# Patient Record
(demographics unavailable — no encounter records)

---

## 2024-12-10 NOTE — RESULTS/DATA
[TextEntry] : CT ZP 12/6/24: Marked emphysema. 1 cm lingular nodule with surrounding GGO. 5mm RLL and 9mm subpleural RLL nodules. Mild diffuse bilateral bronchial wall thickening with LLL mucous plugging.

## 2024-12-10 NOTE — PHYSICAL EXAM
[General Appearance - Well Developed] : well developed [General Appearance - Well Nourished] : well nourished [Normal Oropharynx] : normal oropharynx [Neck Appearance] : the appearance of the neck was normal [Heart Sounds] : normal S1 and S2 [Respiration, Rhythm And Depth] : normal respiratory rhythm and effort [Nail Clubbing] : no clubbing of the fingernails [Cyanosis, Localized] : no localized cyanosis

## 2024-12-10 NOTE — DISCUSSION/SUMMARY
[FreeTextEntry1] : Assessment COPD, moderate (496) (J44.9) Cough, persistent (786.2) (R05.3) Mild seasonal rhinitis  Moderate COPD requiring 02 - pulsed - 3-4 LPM. Has POC  Stable questionable nodules  Back pain Cough resolved on Doxycycline in the past  Plan  Zpak and Medrol DosePak  prn Trelegy - briefly 200 dose Sputum C&S Azithromycin T.I.W. CT Chest as needed - not a candidate for aggressive VENTURA or Rx Sput C&S Pursed lip breathing Yearly FLU vaccine Low threshold to antibiotic RX Dalyresp. Nebulizer with albuterol Pred taper and maintenance if continues to have problems Suppressive azith - stopped Pulsed 02 at all times

## 2024-12-10 NOTE — CONSULT LETTER
[Dear  ___] : Dear  [unfilled], [Consult Letter:] : I had the pleasure of evaluating your patient, [unfilled]. [Please see my note below.] : Please see my note below. [Consult Closing:] : Thank you very much for allowing me to participate in the care of this patient.  If you have any questions, please do not hesitate to contact me. [Sincerely,] : Sincerely, [DrJuan  ___] : Dr. ACUNA

## 2024-12-10 NOTE — HISTORY OF PRESENT ILLNESS
[FreeTextEntry1] :    79 yo male retired [place]  and former 100 pack year smoker (DC in ) seen for dyspnea on exertion, and pulmonary nodules. He denies cp, fever, cough, wt loss, sweats, orthopnea, pedal edema or hemoptysis.  There is no family H/O lung cancer, but both of his parents  young in accident.  In the past he has done work under his house and applied fertilizer to his lawn.  His breathing was worse after the preceding activities so he has learned to let others do it. He is exercising less. He is now using spiriva and asmanex and is doing well.  He goes to [facility] [place]. His wife, Mary Jo, reported SHABAZZ in early 2014.  Treated with a prednisone taper and a zpak with marked improvement. Doing well when seen on 9/15/15. Back pain.  Stable dyspnea.  No edema or sputum  02 recommended - Has it - rarely uses it.  18 Enlarging ventral hernia threatening to incarcerate Dr Olivas wants to treat with open reduction and mesh placement Spoke with Dr Olivas about risks today  18 Tolerated hernia repair and cataract surgery  19 Enjoyed winter in [facility] No complaint Doing OK  9/10/19 No complaints  20 No complaints No sputum   20 More SHABAZZ S/P fall with spine fx No better p doxycycline and medrol No sputum or edema Flying to [facility] soon Mary Jo nearing assisted living status   21 Spine fractures with pain  Likes Trelegy Breathing OK At [facility] ED on 21 with GI complaints (NSAID=> GI upset) Rx for UTI and "pneumonia" Await upper endo by Dr Hendricks  May need hernia repair by Dr Olivas   21 Hernia surgery complicated by reaction to analgesia, afib and hypoxia DC to [facility] - off  Dr Olivas monitoring a post op hematoma Remains on AC - 02 prn - self monitors 02sat  21 Cough and congestion after staining his dock - used by a neighbor  22 Kyphoplasty on 22 Has been inactive SHABAZZ SHABAZZ Wants rehab Sees cardiology for PAF  7/15/22 [facility] with pneumonia [place] PARRIS until today ON  - needs continuous pulsed at 3-4 LPM currently Cough Walking Going to Nurse facility with Mary Jo   10/3/22 Cough and congestion To [facility] soon  23 Cough => severe antibiotics and one steroid course Better on ? Augmentin H/O spinal fractures   23 Persistent cough despite multiple antibiotic courses  23 Doxycycline seems to have been effective Cough resolved   12/10/24 I reviewed all recent notes, orders, lab results and images for 15 minutes on all available platforms (including [email or web address], [employer], [employer], and "[employer] Epic prior to this visit and made notes. Persistent cough CT with nodules and consolidations

## 2025-01-07 NOTE — DISCUSSION/SUMMARY
[FreeTextEntry1] : Assessment COPD, moderate (496) (J44.9) Cough, persistent (786.2) (R05.3) Mild seasonal rhinitis  Moderate COPD requiring 02 - pulsed - 3-4 LPM. Has POC  Stable questionable nodules  Back pain Cough resolved on Doxycycline in the past  Plan  Zpak and Medrol DosePak  prn Trelegy - briefly 200 dose Sputum C&S Azithromycin T.I.W. CT Chest as needed - not a candidate for aggressive VENTURA or Rx Pursed lip breathing Yearly FLU vaccine Low threshold to antibiotic RX Dalyresp. Nebulizer with albuterol Pred taper and maintenance if continues to have problems Suppressive azith  Pulsed 02 at all times FU CT by April 3% NS via neb am

## 2025-01-07 NOTE — HISTORY OF PRESENT ILLNESS
[FreeTextEntry1] :    79 yo male retired [place]  and former 100 pack year smoker (DC in ) seen for dyspnea on exertion, and pulmonary nodules. He denies cp, fever, cough, wt loss, sweats, orthopnea, pedal edema or hemoptysis.  There is no family H/O lung cancer, but both of his parents  young in accident.  In the past he has done work under his house and applied fertilizer to his lawn.  His breathing was worse after the preceding activities so he has learned to let others do it. He is exercising less. He is now using spiriva and asmanex and is doing well.  He goes to [facility] [place] His wife, Mary Jo, reported SHABAZZ in early 2014.  Treated with a prednisone taper and a zpak with marked improvement. Doing well when seen on 9/15/15. Back pain.  Stable dyspnea.  No edema or sputum  02 recommended - Has it - rarely uses it.  18 Enlarging ventral hernia threatening to incarcerate Dr Olivas wants to treat with open reduction and mesh placement Spoke with Dr Olivas about risks today  18 Tolerated hernia repair and cataract surgery  19 Enjoyed winter in [facility] No complaint Doing OK  9/10/19 No complaints  20 No complaints No sputum   20 More SHABAZZ S/P fall with spine fx No better p doxycycline and medrol No sputum or edema Flying to [facility] soon Mary Jo nearing assisted living status   21 Spine fractures with pain  Likes Trelegy Breathing OK At [facility] ED on 21 with GI complaints (NSAID=> GI upset) Rx for UTI and "pneumonia" Await upper endo by Dr Hendricks  May need hernia repair by Dr Olivas   21 Hernia surgery complicated by reaction to analgesia, afib and hypoxia DC to [facility] - off  Dr Olivas monitoring a post op hematoma Remains on AC - 02 prn - self monitors 02sat  21 Cough and congestion after staining his dock - used by a neighbor  22 Kyphoplasty on 22 Has been inactive SHABAZZ SHABAZZ Wants rehab Sees cardiology for PAF  7/15/22 [facility] with pneumonia [place] PARRIS until today ON  - needs continuous pulsed at 3-4 LPM currently Cough Walking Going to Nurse facility with Mary Jo   10/3/22 Cough and congestion To [facility] soon  23 Cough => severe antibiotics and one steroid course Better on ? Augmentin H/O spinal fractures   23 Persistent cough despite multiple antibiotic courses  23 Doxycycline seems to have been effective Cough resolved   12/10/24 I reviewed all recent notes, orders, lab results and images for 15 minutes on all available platforms (including [employer], [facility] [employer], [employer], and [employer] Epic prior to this visit and made notes. Persistent cough CT with nodules and consolidations   25 Albuterol neb makes him worse Mild cough OK with suppressive Azithromycin

## 2025-02-25 NOTE — HISTORY OF PRESENT ILLNESS
[de-identified] : Patient is a 85 year old male on home O2 with a history AF on Eliquis. pt recently diagnosed with basal cell carcinoma that was excised by local dermatologist & malignant melanoma to scalp. Path: 1-30-25  Left superior occipital scalp: Basal cell carcinoma nodular type  Right superior parietal scalp: Malignant melanoma measuring approximately .2mm in thickness

## 2025-02-25 NOTE — ASSESSMENT
[FreeTextEntry1] : Patient presents for evaluation of a superficial melanoma of the scalp.  I discussed with him the clinical behavior of these tumors.  I explained to him that superficial melanomas do not have a high risk of metastasis.  I told him the main objective is treatment is to control the disease at the primary site.  I explained to him the role of surgery and management.  I told him this would require wide excision with adequate margins surrounding the disease.  This would lead to a defect that at least 3.5 cm in greatest dimension.  I told him that the defect can be closed using a combination of a local advancement flap with or without a full-thickness skin graft.  I explained to him the nature of the operation including the risk benefits and potential sequelae which she understood.  He wants to undergo surgery under my care.  I have made arrangements for this to occur in our outpatient facility.  I did discuss the issues with his oxygen use given that we use electrocautery for the operation.  He states he can be off oxygen for extended periods of time which would facilitate the safe conduct of surgery.  I will see him again at the time of the operation.  I will see him sooner should changes or issues arise.

## 2025-02-25 NOTE — ADDENDUM
[FreeTextEntry1] : Documented by Benito Shields acting as a scribe for Dr. Johana Johnson on 2/25/25. All medical record entries made by the Scribe were at my, Dr. Johana Johnson, direction and personally dictated by me on 2/25/25. I have reviewed the chart and agree that the record accurately reflects my personal performance of the history, physical exam, assessment and plan. I have also personally directed, reviewed, and agree with the discharge instructions.

## 2025-02-25 NOTE — PHYSICAL EXAM
[de-identified] : Examination is limited to the head and neck area. The patient is a well-nourished individual who is in no acute distress and appears their stated age.   Examination of the head and neck skin reveals multiple areas of keratosis throughout the head and neck skin. Several scars from prior skin cancer treatment were noted. A healing biopsy site that was well circumscribed, measured 1.5cm in greatest dimension, and appeared superficial without adherence to under lying tissue was present on the right superior parietal scalp. The examination of this area was partially obscured by inflammation, but I did not observe significant residual pigmented changed.   There are no palpable masses in the parotid or submandibular regions. The jugular and posterior cervical chains are devoid of palpable adenopathy. There are no palpable diseases within the central compartment.   Facial nerve function is intact. No other cranial nerve deficits are identified.   The patient is able to open their mouth fully. There is no restriction in tongue mobility. Visualization and palpation of the contents of the oral cavity shows no evidence of mucosal or submucosal pathology.   Visualization and palpation in the oropharynx show no lesion in the base of the tongue, tonsil or soft palate.

## 2025-03-10 NOTE — PROCEDURE
[FreeTextEntry1] : Wide resection of right paramedian scalp melanoma with local flap closure  [FreeTextEntry3] : Surgeon: Dr. Johana Johnson Assistant: Jessie DEJESUS Preoperative Diagnosis: Malignant melanoma of right paramedian scalp Postoperative Diagnosis: Same Anesthesia: Buffered lidocaine with 1% epinephrine 15cc     Description of Procedure:   After informed consent was obtained and appropriate timeout procedure was completed, the patient was brought to the procedure room and placed in the supine. The area was prepped and draped in the usual sterile fashion. Local anesthesia with was administered. O2 saturation was monitored throughout the case. oxygen was turned off during electrocautery to minimize risk of fire.   An incision was marked with at least 1cm margins around the clinically evident extent of the lesion. The incision was made with a 15 blade and electrocautery then used to perform the remainder of the dissection. The lesion was excised down to the periosteum oriented with a silk suture in the anterior position and sent in formalin for pathological examination. Hemostasis was achieved with electrocautery. Additional margin was taken at the deep aspect including the periosteum and send separately for pathological evaluation in formalin.   A local advancement flap was designed and elevated adjacent to the defect. The skin was widely undermined and rotated medially to fill the defect. The flap was inset with 0 and 2-0 prolene sutures in layers, ensuring good vascularity with some tension.   Estimated Blood Loss: 10cc Complications: None Plan: Follow-up in 2 weeks for wound check and suture removal. Instructions given for wound care and patient was asked to contact us for signs of infection or if other issues arise.

## 2025-03-10 NOTE — REASON FOR VISIT
[Procedure: _________] : a [unfilled] procedure visit [FreeTextEntry1] : Wide resection of right paramedian scalp melanoma with local flap closure

## 2025-03-10 NOTE — ADDENDUM
[FreeTextEntry1] : Documented by Benito Shields acting as a scribe for Dr. Johana Johnson on 3/10/2025. All medical record entries made by the Scribe were at my, Dr. Johana Johnson, direction and personally dictated by me on 3/10/2025. I have reviewed the chart and agree that the record accurately reflects my personal performance of the history, physical exam, assessment and plan. I have also personally directed, reviewed, and agree with the discharge instructions.

## 2025-03-25 NOTE — PHYSICAL EXAM
[de-identified] :  Sutures were removed from the right paramedian scalp and steristrips were applied. The incision is healing nicely and there is no sign of infection. The remainder of the head neck evaluation was noncontributory to the current problem.

## 2025-03-25 NOTE — HISTORY OF PRESENT ILLNESS
[de-identified] : Patient is an 85 year old male with a history of malignant melanoma of the right paramedian scalp for which he underwent a wide resection of right paramedian scalp melanoma with local flap closure. The procedure was performed on 3/10/2025.     [FreeTextEntry1] : Patient is an 85 year old male who presents for a post operative visit of his procedure done on 3/10/2025. He offers no complaints.

## 2025-03-25 NOTE — PHYSICAL EXAM
[de-identified] :  Sutures were removed from the right paramedian scalp and steristrips were applied. The incision is healing nicely and there is no sign of infection. The remainder of the head neck evaluation was noncontributory to the current problem.

## 2025-03-25 NOTE — HISTORY OF PRESENT ILLNESS
[de-identified] : Patient is an 85 year old male with a history of malignant melanoma of the right paramedian scalp for which he underwent a wide resection of right paramedian scalp melanoma with local flap closure. The procedure was performed on 3/10/2025.     [FreeTextEntry1] : Patient is an 85 year old male who presents for a post operative visit of his procedure done on 3/10/2025. He offers no complaints.

## 2025-03-25 NOTE — ASSESSMENT
[FreeTextEntry1] :  Patient is an 85 year old male who presents for a post operative follow up of his procedure performed on 3/10/2025. Their incision is healing nicely. I discussed the pathology with the patient - Deep margin of scalp, biopsy:  - Residual malignant melanoma in situ arising in association with scar and prior procedure changes, inked margins are free of tumor. There is no other intervention needed. He is advised to follow up in 3 months. Instructions given for wound care and patient was asked to contact us sooner if signs of infection or if other issues arise.

## 2025-03-25 NOTE — ADDENDUM
[FreeTextEntry1] : Documented by Benito Shields acting as a scribe for Dr. Johana Johnson on 3/24/2025. All medical record entries made by the Scribe were at my, Dr. Johana Johnson, direction and personally dictated by me on 3/24/2025. I have reviewed the chart and agree that the record accurately reflects my personal performance of the history, physical exam, assessment and plan. I have also personally directed, reviewed, and agree with the discharge instructions.

## 2025-04-17 NOTE — HISTORY OF PRESENT ILLNESS
[FreeTextEntry1] :    81 yo male retired [place]  and former 100 pack year smoker (DC in ) seen for dyspnea on exertion, and pulmonary nodules. He denies cp, fever, cough, wt loss, sweats, orthopnea, pedal edema or hemoptysis.  There is no family H/O lung cancer, but both of his parents  young in accident.  In the past he has done work under his house and applied fertilizer to his lawn.  His breathing was worse after the preceding activities so he has learned to let others do it. He is exercising less. He is now using spiriva and asmanex and is doing well.  He goes to [facility] [place] His wife, Mary Jo, reported SHABAZZ in early 2014.  Treated with a prednisone taper and a zpak with marked improvement. Doing well when seen on 9/15/15. Back pain.  Stable dyspnea.  No edema or sputum  02 recommended - Has it - rarely uses it.  18 Enlarging ventral hernia threatening to incarcerate Dr Olivas wants to treat with open reduction and mesh placement Spoke with Dr Olivas about risks today  18 Tolerated hernia repair and cataract surgery  19 Enjoyed winter in [facility] No complaint Doing OK  9/10/19 No complaints  20 No complaints No sputum   20 More SHABAZZ S/P fall with spine fx No better p doxycycline and medrol No sputum or edema Flying to [facility] soon Mary Jo nearing assisted living status   21 Spine fractures with pain  Likes Trelegy Breathing OK At [facility] ED on 21 with GI complaints (NSAID=> GI upset) Rx for UTI and "pneumonia" Await upper endo by Dr Hendricks  May need hernia repair by Dr Olivas   21 Hernia surgery complicated by reaction to analgesia, afib and hypoxia DC to [facility] - off  Dr Olivas monitoring a post op hematoma Remains on AC - 02 prn - self monitors 02sat  21 Cough and congestion after staining his dock - used by a neighbor  22 Kyphoplasty on 22 Has been inactive SHABAZZ SHABAZZ Wants rehab Sees cardiology for PAF  7/15/22 [facility] with pneumonia [place] PARRIS until today ON 02 - needs continuous pulsed at 3-4 LPM currently Cough Walking Going to Nurse facility with Mary Jo   10/3/22 Cough and congestion To [facility] soon  23 Cough => severe antibiotics and one steroid course Better on ? Augmentin H/O spinal fractures   23 Persistent cough despite multiple antibiotic courses  23 Doxycycline seems to have been effective Cough resolved   12/10/24 I reviewed all recent notes, orders, lab results and images for 15 minutes on all available platforms (including [employer], [facility], [employer], and [employer] Epic prior to this visit and made notes. Persistent cough CT with nodules and consolidations   25 Albuterol neb makes him worse Mild cough OK with suppressive Azithromycin  25 Baseline Low 02sat at times Tends not to drink enough fluids

## 2025-04-17 NOTE — RESULTS/DATA
[TextEntry] : CT ZP 12/6/24: Marked emphysema. 1 cm lingular nodule with surrounding GGO. 5mm RLL and 9mm subpleural RLL nodules. Mild diffuse bilateral bronchial wall thickening with LLL mucous plugging.  CT NW 3/31/25: 7 x 6 mm subpleural nodule opacity in left lower lobe. However, slight increase in measurement is likely due to adjacent atelectasis. Additional stable pulmonary nodules measuring up to 7 mm. Emphysema

## 2025-05-06 NOTE — HISTORY OF PRESENT ILLNESS
[de-identified] : Patient is an 85 year old male with a history of malignant melanoma of the right paramedian scalp for which he underwent a wide resection of right paramedian scalp melanoma with local flap closure. The procedure was performed on 3/10/2025   Path:   Final Diagnosis    1. Deep margin of scalp, biopsy: - Residual malignant melanoma in situ arising in association with scar and prior procedure changes, inked margins are free of tumor, see comment. Comment: Malignant melanoma in situ extends to within 2 mm of the nearest blue inked margin (3:00). Prior biopsy, [id number], from [facility], [place], [place] is reviewed and shows malignant melanoma in situ. There is an incidental actinic keratosis.    2. Right paramedian scalp lesion stitch marks anterior, biopsy: - No tumor is seen

## 2025-05-06 NOTE — ASSESSMENT
[FreeTextEntry1] :  Patient is an 85 year old male who presents for a post operative follow up of his procedure performed on 3/10/2025. Their incision is healing nicely overall although there is slight dehiscence. I recommended he regularly follow up with his dermatologist. There is no other intervention needed. He is advised to follow up in 2 months. Instructions given for wound care and patient was asked to contact us sooner if signs of infection or if other issues arise.

## 2025-05-06 NOTE — PHYSICAL EXAM
[de-identified] : Examination is limited to the head and neck area. The patient is a well-nourished individual who is in no acute distress and appears their stated age.   Examination of the head and neck skin reveals well healing surgical site at the right paramedian scalp with some dehiscence. There is multiple other skin lesions noted.   There are no palpable masses in the parotid or submandibular regions. The jugular and posterior cervical chains are devoid of palpable adenopathy. There are no palpable diseases within the central compartment.   Facial nerve function is intact. No other cranial nerve deficits are identified.

## 2025-06-21 NOTE — HISTORY OF PRESENT ILLNESS
[FreeTextEntry1] : SCCis on the Right superior lateral neck [de-identified] : 06/20/2025   Referred by: Dr. Mcmanus   We had the pleasure of seeing your patient in consultation for Mohs Micrographic Surgery.  Mr. PATITO JIMENEZ is a 85 year old M who presents for SCC at least in situ on the right superior lateral neck. Had been present x mos prior to biopsy, which showed SCC at least in situ extending to peripheral and deep margins.    Recently had wide resection of right paramedian scalp melanoma with local flap closure. The procedure was performed on 3/10/25 with Dr. Johnson  Upcoming travel plans: none   Pertinent positives noted below History of HIV or hepatitis: No Blood thinners: xarelto Antibiotic Prophylaxis: None Medical implants: None   The patient's review of systems questionnaire was reviewed. Education needs were identified. There were no barriers to learning.   Mohs surgery consultation for SCC at least in situ on the Right superior lateral neck   -- I explained the treatment options of topical immunomodulatory or chemotherapy, electrodessication and curettage, radiation therapy, excision and Mohs surgery. I recommended Mohs surgery due to the tumor type, location, and ill-defined nature of cancer.   Mohs Appropriate Use Criteria (AUC) Score: 8   I explained that following extirpation there will be a full thickness defect of the involved area. The reconstructive options will be based on the defect size and surrounding tissue laxity of the involved area. Primary closure is only possible for smaller defects. For larger defects, local tissue rearrangement or skin grafting may be necessary. Risks following layered primary closure or local tissue rearrangement include wound dehiscence, contour irregularity, bleeding, infection, and paresthesia (nerve damage including sensory deficit or motor weakness). Risks following skin grafting include wound dehiscence, skin graft nonadherence (partial or complete), contour irregularity, bleeding, infection, paresthesia, and donor site complications. I explained that the patient will need to abstain from physical activity for 1-2 weeks following the surgery, that they would heal with a scar, and also discussed the chances of infection, bleeding, potential sensory or motor nerve damage, and recurrence. The patient indicated that s/he understood the risks and wished to proceed today -- In particular, for reconstruction we discussed repair with linear closure   Thank you for this Mohs surgery referral. We recommended that Mr. PATITO JIMENEZ follow up with His referring dermatologist for routine skin exams following surgery.

## 2025-06-21 NOTE — ASSESSMENT
[FreeTextEntry1] : Mohs surgery for SCC, invasive right superior lateral neck -- 2 stage(s) of Mohs surgery performed 06/20/2025 -- Primary intermediate performed -- f/u for wound check prn -- Mupirocin oint bid -- f/u for routine skin exams as previously recommended by His referring dermatologist.   Thank you for this Mohs surgery referral.   Jeanette Linn MD Physician, Dermatology & Dermatologic Surgery [facility].

## 2025-06-21 NOTE — HISTORY OF PRESENT ILLNESS
[FreeTextEntry1] : SCCis on the Right superior lateral neck [de-identified] : 06/20/2025   Referred by: Dr. Mcmanus   We had the pleasure of seeing your patient in consultation for Mohs Micrographic Surgery.  Mr. PATITO JIMENEZ is a 85 year old M who presents for SCC at least in situ on the right superior lateral neck. Had been present x mos prior to biopsy, which showed SCC at least in situ extending to peripheral and deep margins.    Recently had wide resection of right paramedian scalp melanoma with local flap closure. The procedure was performed on 3/10/25 with Dr. Johnson  Upcoming travel plans: none   Pertinent positives noted below History of HIV or hepatitis: No Blood thinners: xarelto Antibiotic Prophylaxis: None Medical implants: None   The patient's review of systems questionnaire was reviewed. Education needs were identified. There were no barriers to learning.   Mohs surgery consultation for SCC at least in situ on the Right superior lateral neck   -- I explained the treatment options of topical immunomodulatory or chemotherapy, electrodessication and curettage, radiation therapy, excision and Mohs surgery. I recommended Mohs surgery due to the tumor type, location, and ill-defined nature of cancer.   Mohs Appropriate Use Criteria (AUC) Score: 8   I explained that following extirpation there will be a full thickness defect of the involved area. The reconstructive options will be based on the defect size and surrounding tissue laxity of the involved area. Primary closure is only possible for smaller defects. For larger defects, local tissue rearrangement or skin grafting may be necessary. Risks following layered primary closure or local tissue rearrangement include wound dehiscence, contour irregularity, bleeding, infection, and paresthesia (nerve damage including sensory deficit or motor weakness). Risks following skin grafting include wound dehiscence, skin graft nonadherence (partial or complete), contour irregularity, bleeding, infection, paresthesia, and donor site complications. I explained that the patient will need to abstain from physical activity for 1-2 weeks following the surgery, that they would heal with a scar, and also discussed the chances of infection, bleeding, potential sensory or motor nerve damage, and recurrence. The patient indicated that s/he understood the risks and wished to proceed today -- In particular, for reconstruction we discussed repair with linear closure   Thank you for this Mohs surgery referral. We recommended that Mr. PATITO JIMENEZ follow up with His referring dermatologist for routine skin exams following surgery.

## 2025-06-21 NOTE — ASSESSMENT
[FreeTextEntry1] : Mohs surgery for SCC, invasive right superior lateral neck -- 2 stage(s) of Mohs surgery performed 06/20/2025 -- Primary intermediate performed -- f/u for wound check prn -- Mupirocin oint bid -- f/u for routine skin exams as previously recommended by His referring dermatologist.   Thank you for this Mohs surgery referral.   Jeanette Linn MD Physician, Dermatology & Dermatologic Surgery [facility].

## 2025-06-21 NOTE — PHYSICAL EXAM
[Alert] : alert [Oriented x 3] : ~L oriented x 3 [Well Nourished] : well nourished [Conjunctiva Non-injected] : conjunctiva non-injected [No Visual Lymphadenopathy] : no visual  lymphadenopathy [No Clubbing] : no clubbing [No Edema] : no edema [No Bromhidrosis] : no bromhidrosis [No Chromhidrosis] : no chromhidrosis [Hair] : Hair [Scalp] : Scalp [Face] : Face [Nose] : Nose [Eyelids] : Eyelids [Ears] : Ears [Neck] : Neck [Nodes] : Nodes [FreeTextEntry3] : Right superior lateral neck - 1.5 cm x1.5 cm pink patch No regional LAD

## 2025-06-21 NOTE — PROCEDURE
[TextEntry] : Mohs Surgery Procedure Note   A surgical time out was taken to confirm the patient's correct identity and the correct site. The operative site was identified by the patient and surgical team prior to surgery and the patient agreed to proceed with the surgical site which was marked prior to anesthesia infiltration.   Mohs Micrographic Surgery Report Date Collected: 06/20/2025 Date Received: Same as above Date Verified: Same as above Attending Surgeon: Jeanette Linn MD Fellow: Zoe Vincent MD Assistants: Frannie Gutierrez RN, Catherine Dillard MA,  Faiza Hi MA Procedure#:  Diagnosis: SCC, invasive Location: Right superior lateral neck Pre-op Size: 1.5 cmx 1.5 cm Post-Operative Final Defect Size: 3.0 cm x 2.1 cm Stages (number of pieces per stage): 1 (2/1) Pathology, if tumor noted in stages: Debulk with atypical keratinocytic proliferation consistent with invasive SCC. Stage I with sparse perivascular lymphocytic infiltrate without evidence of residual carcinoma on sections examined Indications for procedure: Ill-defined tumor margins, high-priority anatomic location for preservation of normal tissue, aggressive histologic nature of the tumor Repair type:primary intermediate linear layered Subcutaneous and dermal sutures used: 4-0 Vicryl.  Epidermal sutures: 5-0 chromic Total volume of anesthesia: 10 mL Repair length: 4.0 cm   Mohs Procedure Report:   The patient was escorted to the outpatient surgical operatory. The proposed Mohs procedure and reconstruction options were discussed with the patient. The risks, benefits, and alternatives were discussed and the patient signed a written consent form. A time out was taken to confirm the patient's identity and the exact location of the skin cancer. After the patient was placed in a recumbent position, the surgical site was cleaned with alcohol and either Betadine (for eyes and ears) or chlorhexidine gluconate, draped, and infiltrated with 0.5% lidocaine with 1:200,000 epinephrine local anesthetic. A sterile surgical marking pen was used to outline a thin margin of normal-appearing skin around the tumor. A beveled incision was then made using a scalpel. Small orienting nicks were made on the specimen and the surrounding skin. The tissue was then sharply dissected from the surrounding skin. Hemostasis was maintained with the electrosurgical unit and/or pressure. A temporary dressing was placed on the surgical defect until the frozen section slides were interpreted. The oriented specimen was placed in a Ellen dish and transported to the Mohs laboratory. For each stage of the procedure, a visual representation of the specimen was drawn on a Mohs map. This map graphically depicts the specimen's two-dimensional appearance, orientation, and tissue preparation, which consists of dividing the specimen and applying tissue dyes. Because the deep and peripheral portions of the tissue are then embedded in the same geometric plane, the map also represents an oriented scale drawing of the resulting histologic sections. The Mohs technician then prepared frozen section slides using standard techniques. The slides were stained with hematoxylin and eosin, and cover slips were applied. The frozen section slides were then examined under the microscope. If tumor was found, it was localized on the map. The orienting nicks on the original specimen corresponded to similar nicks on the surgical defect so areas of identified tumor could be mapped back to the patient and resected. Additional layers of removed skin were then processed as indicated above. This iterative process continued as applicable until no tumor was observed microscopically. At this stage, the Mohs resection was complete.    RECONSTRUCTION PROCEDURE NOTE INTERMEDIATE LINEAR LAYERED CLOSURE   Prior to beginning the procedure, patient identity was verified, as well as the procedure to be performed and the site.  All equipment required was ready and available. The patient was positioned appropriately.   INDICATIONS:  Various reconstructive modalities were discussed with the patient, and it was decided that an intermediate linear layered closure would best preserve normal anatomical and functional relationships. After a discussion of the risks including but not limited to bleeding, scarring, infection, nerve damage, unsatisfactory results, and wound dehiscense, informed consent was obtained, and the patient underwent the procedure as follows.   PROCEDURE:  The patient was taken to the operative suite and placed supine on the operating room table. The area was anesthetized with 1% Lidocaine with 1/100,000 epinephrine. The area was washed with Chlorhexidine or Betadine, rinsed with sterile saline, and draped with sterile towels. The wound edges were debeveled, and the wound was undermined widely in all directions if needed. Hemostasis was obtained with spot electrodessication if needed. Deep dermal and subcutaneous closure was performed using the indicated buried sutures.  Using a 15 blade scalpel, redundant cones were removed as Burow's triangles to align with the relaxed skin tension lines in a curvilinear fashion if needed. The epidermis was closed and approximated using the indicated sutures. The final wound length is noted above. A sterile pressure dressing was applied, and wound care instructions were given.   FINAL PROCEDURE: Intermediate linear layered closure   COMPLICATIONS: None